# Patient Record
Sex: MALE | Race: WHITE | NOT HISPANIC OR LATINO | Employment: FULL TIME | URBAN - METROPOLITAN AREA
[De-identification: names, ages, dates, MRNs, and addresses within clinical notes are randomized per-mention and may not be internally consistent; named-entity substitution may affect disease eponyms.]

---

## 2023-12-14 ENCOUNTER — OFFICE VISIT (OUTPATIENT)
Dept: URGENT CARE | Facility: CLINIC | Age: 53
End: 2023-12-14
Payer: COMMERCIAL

## 2023-12-14 VITALS
SYSTOLIC BLOOD PRESSURE: 102 MMHG | TEMPERATURE: 98 F | DIASTOLIC BLOOD PRESSURE: 70 MMHG | OXYGEN SATURATION: 97 % | HEIGHT: 76 IN | WEIGHT: 262.88 LBS | HEART RATE: 76 BPM | RESPIRATION RATE: 18 BRPM | BODY MASS INDEX: 32.01 KG/M2

## 2023-12-14 DIAGNOSIS — H10.9 CONJUNCTIVITIS OF LEFT EYE, UNSPECIFIED CONJUNCTIVITIS TYPE: Primary | ICD-10-CM

## 2023-12-14 PROCEDURE — 99203 PR OFFICE/OUTPT VISIT, NEW, LEVL III, 30-44 MIN: ICD-10-PCS | Mod: S$GLB,,, | Performed by: NURSE PRACTITIONER

## 2023-12-14 PROCEDURE — 99203 OFFICE O/P NEW LOW 30 MIN: CPT | Mod: S$GLB,,, | Performed by: NURSE PRACTITIONER

## 2023-12-14 RX ORDER — CITALOPRAM 20 MG/1
1 TABLET, FILM COATED ORAL DAILY
COMMUNITY
Start: 2023-11-24

## 2023-12-14 RX ORDER — ROSUVASTATIN CALCIUM 20 MG/1
20 TABLET, COATED ORAL
COMMUNITY

## 2023-12-14 RX ORDER — PANTOPRAZOLE SODIUM 40 MG/1
40 TABLET, DELAYED RELEASE ORAL EVERY MORNING
COMMUNITY

## 2023-12-14 RX ORDER — ASPIRIN 81 MG/1
1 TABLET ORAL EVERY MORNING
COMMUNITY

## 2023-12-14 RX ORDER — METOPROLOL SUCCINATE 25 MG/1
1 TABLET, EXTENDED RELEASE ORAL DAILY
COMMUNITY
Start: 2023-11-20

## 2023-12-14 RX ORDER — POLYMYXIN B SULFATE AND TRIMETHOPRIM 1; 10000 MG/ML; [USP'U]/ML
1 SOLUTION OPHTHALMIC EVERY 6 HOURS
Qty: 10 ML | Refills: 0 | Status: SHIPPED | OUTPATIENT
Start: 2023-12-14 | End: 2023-12-21

## 2023-12-14 NOTE — PATIENT INSTRUCTIONS
Hand hygiene  Alternate Cool and warm compresses to left eye as needed for comfort  Continue Zyrtec as directed  OTC allergy eye drops as directed  If not improved with allergy drop and symptoms worsen or progress, initiate Polytrim drops as directed   Typical course and duration of illness discussed  Signs and symptoms of worsening discussed  Follow up as needed/with worsening

## 2023-12-14 NOTE — PROGRESS NOTES
"Subjective:      Patient ID: Dc Charles is a 53 y.o. male.    Vitals:  height is 6' 4" (1.93 m) and weight is 119.3 kg (262 lb 14.4 oz). His oral temperature is 98.2 °F (36.8 °C). His blood pressure is 102/70 and his pulse is 76. His respiration is 18 and oxygen saturation is 97%.     Chief Complaint: Stye    53 year old male presents for evaluation of stye to left eye x 3 days. C/O upper eyelid swelling, discomfort and occasional watery discharge. Symptom onset Sunday night/Monday. OTC ointment without relief. History of allergies managed with Zyrtec daily. No contact lens use.     Eye Problem   The left eye is affected. This is a new problem. The current episode started in the past 7 days. The problem occurs constantly. The problem has been unchanged. There was no injury mechanism. The pain is at a severity of 0/10. The patient is experiencing no pain. There is No known exposure to pink eye. He Does not wear contacts. Associated symptoms include an eye discharge (watery), eye redness and itching. Pertinent negatives include no blurred vision, double vision, fever, foreign body sensation, nausea, photophobia, recent URI or vomiting. The treatment provided no relief.       Constitution: Negative. Negative for fever.   HENT: Negative.     Neck: neck negative.   Cardiovascular: Negative.    Eyes:  Positive for eye discharge (watery), eye itching, eye redness and eyelid swelling (upper lid). Negative for eye trauma, foreign body in eye, eye pain, photophobia, vision loss, double vision and blurred vision.   Respiratory: Negative.     Gastrointestinal: Negative.  Negative for nausea and vomiting.   Endocrine: negative.   Genitourinary: Negative.    Musculoskeletal: Negative.    Skin: Negative.    Allergic/Immunologic: Negative.  Positive for seasonal allergies.   Neurological: Negative.    Hematologic/Lymphatic: Negative.    Psychiatric/Behavioral: Negative.        Objective:     Physical Exam   Eyes: Left eye " exhibits discharge (watery). Left eye exhibits no chemosis, no exudate and no hordeolum. No foreign body present in the left eye. Left conjunctiva is not injected. Left conjunctiva has no hemorrhage. Left eye exhibits normal extraocular motion and no nystagmus.      Comments: Left upper lid edema/erythema. Watery discharge. Tarsal conjunctiva erythema/bumpy appearance.       Assessment:     1. Conjunctivitis of left eye, unspecified conjunctivitis type        Plan:       Patient presents with symptoms that are consistent with left eye allergic conjunctivitis. Plan is to manage symptoms and prevent worsening. Decision to prescribe Polytrim course if symptoms worsen/persist. Discussed with patient who verbalizes understanding.     Conjunctivitis of left eye, unspecified conjunctivitis type  -     polymyxin B sulf-trimethoprim (POLYTRIM) 10,000 unit- 1 mg/mL Drop; Place 1 drop into the left eye every 6 (six) hours. for 7 days  Dispense: 10 mL; Refill: 0                Patient Instructions   Hand hygiene  Alternate Cool and warm compresses to left eye as needed for comfort  Continue Zyrtec as directed  OTC allergy eye drops as directed  If not improved with allergy drop and symptoms worsen or progress, initiate Polytrim drops as directed   Typical course and duration of illness discussed  Signs and symptoms of worsening discussed  Follow up as needed/with worsening

## 2023-12-28 ENCOUNTER — OFFICE VISIT (OUTPATIENT)
Dept: URGENT CARE | Facility: CLINIC | Age: 53
End: 2023-12-28
Payer: COMMERCIAL

## 2023-12-28 VITALS
WEIGHT: 262 LBS | SYSTOLIC BLOOD PRESSURE: 100 MMHG | TEMPERATURE: 99 F | BODY MASS INDEX: 31.9 KG/M2 | OXYGEN SATURATION: 95 % | RESPIRATION RATE: 17 BRPM | HEART RATE: 82 BPM | HEIGHT: 76 IN | DIASTOLIC BLOOD PRESSURE: 67 MMHG

## 2023-12-28 DIAGNOSIS — J10.1 INFLUENZA A: Primary | ICD-10-CM

## 2023-12-28 DIAGNOSIS — R06.02 SOB (SHORTNESS OF BREATH): ICD-10-CM

## 2023-12-28 DIAGNOSIS — R05.1 ACUTE COUGH: ICD-10-CM

## 2023-12-28 LAB
CTP QC/QA: YES
CTP QC/QA: YES
POC MOLECULAR INFLUENZA A AGN: POSITIVE
POC MOLECULAR INFLUENZA B AGN: NEGATIVE
SARS-COV-2 AG RESP QL IA.RAPID: NEGATIVE

## 2023-12-28 PROCEDURE — 87811 SARS CORONAVIRUS 2 ANTIGEN POCT, MANUAL READ: ICD-10-PCS | Mod: QW,S$GLB,, | Performed by: PHYSICIAN ASSISTANT

## 2023-12-28 PROCEDURE — 99213 PR OFFICE/OUTPT VISIT, EST, LEVL III, 20-29 MIN: ICD-10-PCS | Mod: S$GLB,,, | Performed by: PHYSICIAN ASSISTANT

## 2023-12-28 PROCEDURE — 87502 POCT INFLUENZA A/B MOLECULAR: ICD-10-PCS | Mod: QW,S$GLB,, | Performed by: PHYSICIAN ASSISTANT

## 2023-12-28 PROCEDURE — 87502 INFLUENZA DNA AMP PROBE: CPT | Mod: QW,S$GLB,, | Performed by: PHYSICIAN ASSISTANT

## 2023-12-28 PROCEDURE — 87811 SARS-COV-2 COVID19 W/OPTIC: CPT | Mod: QW,S$GLB,, | Performed by: PHYSICIAN ASSISTANT

## 2023-12-28 PROCEDURE — 99213 OFFICE O/P EST LOW 20 MIN: CPT | Mod: S$GLB,,, | Performed by: PHYSICIAN ASSISTANT

## 2023-12-28 RX ORDER — ALBUTEROL SULFATE 90 UG/1
2 AEROSOL, METERED RESPIRATORY (INHALATION) EVERY 6 HOURS PRN
Qty: 8 G | Refills: 0 | Status: SHIPPED | OUTPATIENT
Start: 2023-12-28 | End: 2024-01-19

## 2023-12-28 RX ORDER — PROMETHAZINE HYDROCHLORIDE AND DEXTROMETHORPHAN HYDROBROMIDE 6.25; 15 MG/5ML; MG/5ML
5 SYRUP ORAL EVERY 6 HOURS PRN
Qty: 118 ML | Refills: 0 | Status: SHIPPED | OUTPATIENT
Start: 2023-12-28

## 2023-12-28 RX ORDER — OSELTAMIVIR PHOSPHATE 75 MG/1
75 CAPSULE ORAL 2 TIMES DAILY
Qty: 10 CAPSULE | Refills: 0 | Status: SHIPPED | OUTPATIENT
Start: 2023-12-28 | End: 2024-01-02

## 2023-12-28 NOTE — PROGRESS NOTES
"Subjective:      Patient ID: Dc Charles is a 53 y.o. male.    Vitals:  height is 6' 4" (1.93 m) and weight is 118.8 kg (262 lb). His tympanic temperature is 99.1 °F (37.3 °C). His blood pressure is 100/67 and his pulse is 82. His respiration is 17 and oxygen saturation is 95%.     Chief Complaint: Cough    Presents with body aches, cough, postnasal drip, nasal congestion, heart burn, shortness of breath, and headaches. Symptoms present for 3 days. Has taken ibuprofen, tylenol, dayquil, nyquil and sudafed. No relief. Reports sob is intermittent; mostly with coughing or laying down. No hx of asthma or lung issues. No chest pain. Tmax 99.1. Patient states his kids has bronchitis.    Cough  This is a new problem. The current episode started in the past 7 days. The problem has been gradually worsening. The cough is Non-productive. Associated symptoms include headaches, myalgias, nasal congestion, postnasal drip and shortness of breath. Pertinent negatives include no ear pain, sore throat or wheezing. The symptoms are aggravated by lying down. Treatments tried: tylenol, ibuprofen, dayquil, nyquil and sudafed. The treatment provided no relief.       Constitution: Negative.   HENT:  Positive for postnasal drip. Negative for ear pain and sore throat.    Cardiovascular: Negative.    Respiratory:  Positive for cough and shortness of breath. Negative for wheezing and asthma.    Gastrointestinal: Negative.    Musculoskeletal:  Positive for muscle ache.   Skin: Negative.    Allergic/Immunologic: Negative for asthma.   Neurological:  Positive for headaches.      Objective:     Physical Exam   Constitutional: He appears well-developed.  Non-toxic appearance. He does not appear ill. No distress.   HENT:   Head: Normocephalic and atraumatic.   Ears:   Right Ear: External ear normal.   Left Ear: External ear normal.   Nose: Nose normal.   Eyes: Conjunctivae and EOM are normal.   Neck: Neck supple.   Pulmonary/Chest: Effort normal " and breath sounds normal.   Abdominal: Normal appearance.   Musculoskeletal: Normal range of motion.         General: Normal range of motion.   Neurological: no focal deficit. He is alert. He displays no weakness. Gait normal.   Skin: Skin is warm, dry, not diaphoretic, not pale and no rash.   Psychiatric: His behavior is normal.     Results for orders placed or performed in visit on 12/28/23   POCT Influenza A/B MOLECULAR   Result Value Ref Range    POC Molecular Influenza A Ag Positive (A) Negative, Not Reported    POC Molecular Influenza B Ag Negative Negative, Not Reported     Acceptable Yes    SARS Coronavirus 2 Antigen, POCT Manual Read   Result Value Ref Range    SARS Coronavirus 2 Antigen Negative Negative     Acceptable Yes          Assessment:     1. Influenza A    2. Acute cough    3. SOB (shortness of breath)        Plan:     VSS. Positive for Flu A.  Discussed with patient that Tamiflu or Xofluza is optional and also discussed common side effects.  Pt opted for Tamiflu. Fever/pain control with Tylenol or Ibuprofen.  Stay home until fever free for 24 hours without medications.  Promethazine dm prn for cough.  Albuterol prn for sob.  Rest and drink plenty of fluids. Follow-up with PCP if symptoms worsen or fail to improve.     Influenza A  -     oseltamivir (TAMIFLU) 75 MG capsule; Take 1 capsule (75 mg total) by mouth 2 (two) times daily. for 5 days  Dispense: 10 capsule; Refill: 0    Acute cough  -     POCT Influenza A/B MOLECULAR  -     SARS Coronavirus 2 Antigen, POCT Manual Read  -     albuterol (PROVENTIL/VENTOLIN HFA) 90 mcg/actuation inhaler; Inhale 2 puffs into the lungs every 6 (six) hours as needed for Shortness of Breath. Rescue  Dispense: 8 g; Refill: 0  -     promethazine-dextromethorphan (PROMETHAZINE-DM) 6.25-15 mg/5 mL Syrp; Take 5 mLs by mouth every 6 (six) hours as needed (cough). May cause drowsiness.  Dispense: 118 mL; Refill: 0    SOB (shortness of  breath)  -     albuterol (PROVENTIL/VENTOLIN HFA) 90 mcg/actuation inhaler; Inhale 2 puffs into the lungs every 6 (six) hours as needed for Shortness of Breath. Rescue  Dispense: 8 g; Refill: 0

## 2024-01-19 DIAGNOSIS — R06.02 SOB (SHORTNESS OF BREATH): ICD-10-CM

## 2024-01-19 DIAGNOSIS — R05.1 ACUTE COUGH: ICD-10-CM

## 2024-01-19 RX ORDER — ALBUTEROL SULFATE 90 UG/1
2 AEROSOL, METERED RESPIRATORY (INHALATION) EVERY 6 HOURS PRN
Qty: 8 G | Refills: 0 | Status: SHIPPED | OUTPATIENT
Start: 2024-01-19

## 2024-06-12 ENCOUNTER — OFFICE VISIT (OUTPATIENT)
Dept: URGENT CARE | Facility: CLINIC | Age: 54
End: 2024-06-12

## 2024-06-12 VITALS
WEIGHT: 246 LBS | BODY MASS INDEX: 29.96 KG/M2 | OXYGEN SATURATION: 96 % | TEMPERATURE: 97 F | HEIGHT: 76 IN | SYSTOLIC BLOOD PRESSURE: 94 MMHG | HEART RATE: 66 BPM | RESPIRATION RATE: 16 BRPM | DIASTOLIC BLOOD PRESSURE: 66 MMHG

## 2024-06-12 DIAGNOSIS — M54.6 ACUTE BILATERAL THORACIC BACK PAIN: ICD-10-CM

## 2024-06-12 DIAGNOSIS — R82.90 ABNORMAL URINALYSIS: ICD-10-CM

## 2024-06-12 DIAGNOSIS — R10.9 BILATERAL FLANK PAIN: Primary | ICD-10-CM

## 2024-06-12 LAB
BILIRUB UR QL STRIP: POSITIVE
GLUCOSE UR QL STRIP: NEGATIVE
KETONES UR QL STRIP: NEGATIVE
LEUKOCYTE ESTERASE UR QL STRIP: NEGATIVE
PH, POC UA: 6
POC BLOOD, URINE: NEGATIVE
POC NITRATES, URINE: NEGATIVE
PROT UR QL STRIP: POSITIVE
SP GR UR STRIP: 1.03 (ref 1–1.03)
UROBILINOGEN UR STRIP-ACNC: 0.2 (ref 0.3–2.2)

## 2024-06-12 NOTE — PROGRESS NOTES
"Subjective:      Patient ID: Dc Charles is a 54 y.o. male.    Vitals:  height is 6' 4" (1.93 m) and weight is 111.6 kg (246 lb). His tympanic temperature is 97.1 °F (36.2 °C). His blood pressure is 94/66 and his pulse is 66. His respiration is 16 and oxygen saturation is 96%.     Chief Complaint: Flank Pain    Pt presents to the clinic today with bilateral flank pain that radiates to mid back, began 6 days ago.  Denies any injury or change in activity.  Patient describes as feeling achy almost as if he has flu, but he does not have any URI symptoms.  Denies any urinary symptoms or history of kidney stones.  Reports that he has been nauseous and had decreased appetite but denies any vomiting, abdominal pain, fever/chills. Pt eats once nausea subsides but then feels bad afterward. Had 2 loose stools since symptoms started but otherwise normal BM.  Patient states that the area is tender to touch and sore.  Pain seems worse when lying down on his back. Reports some relief when taking Ibuprofen. Denies rash, sob, decreased ROM, focal weakness.  Denies history of liver disease, kidney disease or pancreatitis. No hx of abdominal surgery.      Flank Pain  This is a new problem. The current episode started in the past 7 days. The problem occurs constantly. The problem has been gradually worsening since onset. The quality of the pain is described as aching. The pain does not radiate. The pain is at a severity of 5/10. The pain is mild. The pain is Worse during the night. The symptoms are aggravated by lying down. Pertinent negatives include no abdominal pain, bladder incontinence, bowel incontinence, chest pain, dysuria, fever, headaches, leg pain, numbness, paresis, paresthesias, pelvic pain, perianal numbness, tingling, weakness, weight loss, genital pain, hematuria or urethral discharge. He has tried NSAIDs for the symptoms. The treatment provided mild relief.       Constitution: Negative for chills, sweating and " fever.   HENT:  Negative for ear pain, congestion and sore throat.    Cardiovascular:  Negative for chest pain, leg swelling, palpitations and sob on exertion.   Respiratory: Negative.     Gastrointestinal:  Positive for nausea. Negative for abdominal pain, history of abdominal surgery, vomiting, constipation, diarrhea and bowel incontinence.   Genitourinary:  Positive for flank pain. Negative for dysuria, frequency, urine decreased, bladder incontinence, hematuria, history of kidney stones and pelvic pain.   Musculoskeletal:  Positive for back pain and muscle ache. Negative for trauma.   Skin: Negative.  Negative for erythema.   Neurological:  Negative for dizziness, headaches and numbness.      Objective:     Physical Exam   Constitutional: He appears well-developed.  Non-toxic appearance. He does not appear ill. No distress.   HENT:   Head: Normocephalic and atraumatic.   Ears:   Right Ear: External ear normal.   Left Ear: External ear normal.   Nose: Nose normal.   Eyes: Conjunctivae and EOM are normal.   Neck: Neck supple.   Cardiovascular: Normal rate, regular rhythm and normal heart sounds.   Pulmonary/Chest: Effort normal and breath sounds normal.   Abdominal: Normal appearance and bowel sounds are normal. He exhibits no distension. Soft. There is no abdominal tenderness. There is left CVA tenderness and right CVA tenderness. There is no guarding, no tenderness at McBurney's point and negative Andrade's sign.   Musculoskeletal: Normal range of motion.         General: Normal range of motion.      Thoracic back: He exhibits tenderness (paraspinal musculature bilaterally). He exhibits normal range of motion, no bony tenderness, no swelling and no deformity.        Back:    Neurological: no focal deficit. He is alert. He displays no weakness. Gait normal.   Skin: Skin is warm, dry, not diaphoretic, not pale, no rash and not vesicular. No bruising and No erythema         Comments: No jaundice   Psychiatric: His  behavior is normal. Mood and thought content normal.     Results for orders placed or performed in visit on 06/12/24   POCT Urinalysis, Dipstick, Automated, W/O Scope   Result Value Ref Range    POC Blood, Urine Negative Negative    POC Bilirubin, Urine Positive (A) Negative    POC Urobilinogen, Urine 0.2 (A) 0.3 - 2.2    POC Ketones, Urine Negative Negative    POC Protein, Urine Positive (A) Negative    POC Nitrates, Urine Negative Negative    POC Glucose, Urine Negative Negative    pH, UA 6.0     POC Specific Gravity, Urine 1.030 (A) 1.003 - 1.029    POC Leukocytes, Urine Negative Negative       Assessment:     1. Bilateral flank pain    2. Acute bilateral thoracic back pain    3. Abnormal urinalysis        Plan:       Bilateral flank pain  -     POCT Urinalysis, Dipstick, Automated, W/O Scope    Acute bilateral thoracic back pain  -     POCT Urinalysis, Dipstick, Automated, W/O Scope    Abnormal urinalysis          Medical Decision Making:   Initial Assessment:   NAD. VSS. Bilateral flank/mid-back pain x 6 days with no injury. Denies urinary symptoms, vomiting, diarrhea, rash.   Differential Diagnosis:   Renal stone, pyelonephritis, cholelithiasis, cholecystitis, pancreatitis, liver disease, viral syndrome, shingles, MSK strain  Clinical Tests:   Lab Tests: Ordered and Reviewed  Urgent Care Management:  Less likely kidney stone or pyelonephritis; pt denies urinary symptoms, UA with no hematuria or evidence of infection. UA + bilirubin, +urobiligen, + protein. Offered to do blood work today to check CMP, CBC, Amylase & Lipase but pt declined at this time. He is paying FFS today but has insurance and is established with PCP. States he will f/u with PCP.  No evidence of rash. Discussed possibility of viral syndrome vs MSK pain. Recommend to continue OTC meds prn for pain. Can apply heat or topical OTC pain reliever prn. Close f/u with PCP or ED sooner if any new or worsening symptoms.

## 2024-12-28 ENCOUNTER — OFFICE VISIT (OUTPATIENT)
Dept: URGENT CARE | Facility: CLINIC | Age: 54
End: 2024-12-28
Payer: COMMERCIAL

## 2024-12-28 VITALS
SYSTOLIC BLOOD PRESSURE: 117 MMHG | HEIGHT: 76 IN | OXYGEN SATURATION: 96 % | HEART RATE: 56 BPM | BODY MASS INDEX: 30.27 KG/M2 | DIASTOLIC BLOOD PRESSURE: 69 MMHG | TEMPERATURE: 99 F | RESPIRATION RATE: 18 BRPM | WEIGHT: 248.56 LBS

## 2024-12-28 DIAGNOSIS — B34.9 VIRAL SYNDROME: Primary | ICD-10-CM

## 2024-12-28 PROBLEM — Z95.3: Status: ACTIVE | Noted: 2019-10-15

## 2024-12-28 PROBLEM — R01.1 HEART MURMUR: Status: ACTIVE | Noted: 2017-08-30

## 2024-12-28 PROBLEM — I35.0 NONRHEUMATIC AORTIC VALVE STENOSIS: Status: ACTIVE | Noted: 2017-12-05

## 2024-12-28 PROBLEM — K21.9 GASTROESOPHAGEAL REFLUX DISEASE WITHOUT ESOPHAGITIS: Status: ACTIVE | Noted: 2022-12-06

## 2024-12-28 PROBLEM — I51.7 LVH (LEFT VENTRICULAR HYPERTROPHY): Status: ACTIVE | Noted: 2017-12-05

## 2024-12-28 PROBLEM — E66.812 CLASS 2 OBESITY: Status: ACTIVE | Noted: 2020-11-13

## 2024-12-28 PROBLEM — E78.2 MIXED HYPERLIPIDEMIA: Status: ACTIVE | Noted: 2017-08-30

## 2024-12-28 PROBLEM — I35.1 NONRHEUMATIC AORTIC VALVE INSUFFICIENCY: Status: ACTIVE | Noted: 2017-12-05

## 2024-12-28 PROBLEM — E55.9 VITAMIN D DEFICIENCY: Status: ACTIVE | Noted: 2024-03-05

## 2024-12-28 PROBLEM — R73.03 PREDIABETES: Status: ACTIVE | Noted: 2019-10-11

## 2024-12-28 PROBLEM — Z90.3 H/O GASTRIC SLEEVE: Status: ACTIVE | Noted: 2022-12-06

## 2024-12-28 PROBLEM — F32.5 DEPRESSION, MAJOR, IN REMISSION: Status: ACTIVE | Noted: 2017-08-30

## 2024-12-28 PROCEDURE — 99214 OFFICE O/P EST MOD 30 MIN: CPT | Mod: S$GLB,,, | Performed by: EMERGENCY MEDICINE

## 2024-12-28 RX ORDER — IPRATROPIUM BROMIDE 21 UG/1
2 SPRAY, METERED NASAL 2 TIMES DAILY
Qty: 30 ML | Refills: 0 | Status: SHIPPED | OUTPATIENT
Start: 2024-12-28

## 2024-12-28 RX ORDER — ATORVASTATIN CALCIUM 20 MG/1
20 TABLET, FILM COATED ORAL
COMMUNITY
Start: 2024-10-23

## 2024-12-28 RX ORDER — IPRATROPIUM BROMIDE 21 UG/1
2 SPRAY, METERED NASAL 2 TIMES DAILY
Qty: 30 ML | Refills: 0 | Status: SHIPPED | OUTPATIENT
Start: 2024-12-28 | End: 2024-12-28 | Stop reason: SDUPTHER

## 2024-12-28 RX ORDER — PROMETHAZINE HYDROCHLORIDE AND DEXTROMETHORPHAN HYDROBROMIDE 6.25; 15 MG/5ML; MG/5ML
5 SYRUP ORAL EVERY 4 HOURS PRN
Qty: 180 ML | Refills: 0 | Status: SHIPPED | OUTPATIENT
Start: 2024-12-28 | End: 2024-12-28 | Stop reason: SDUPTHER

## 2024-12-28 RX ORDER — FLUTICASONE PROPIONATE 50 MCG
2 SPRAY, SUSPENSION (ML) NASAL DAILY
Qty: 16 G | Refills: 0 | Status: SHIPPED | OUTPATIENT
Start: 2024-12-28 | End: 2024-12-28 | Stop reason: SDUPTHER

## 2024-12-28 RX ORDER — FLUTICASONE PROPIONATE 50 MCG
2 SPRAY, SUSPENSION (ML) NASAL DAILY
Qty: 16 G | Refills: 0 | Status: SHIPPED | OUTPATIENT
Start: 2024-12-28

## 2024-12-28 RX ORDER — PROMETHAZINE HYDROCHLORIDE AND DEXTROMETHORPHAN HYDROBROMIDE 6.25; 15 MG/5ML; MG/5ML
5 SYRUP ORAL EVERY 4 HOURS PRN
Qty: 180 ML | Refills: 0 | Status: SHIPPED | OUTPATIENT
Start: 2024-12-28 | End: 2025-01-07

## 2024-12-28 NOTE — PATIENT INSTRUCTIONS
Use over the counter(OTC) antihistamine like claritin or zyrtec daily.  Flonase: 2 sprays per nostril 1-2 times a day. For congestion.  Atrovent as prescribed for congestion.  Nasal saline drops: 2-4 drops per nostril 10-15 times a day. Thins mucus    Tylenol or Motrin for fever or pain per label instructions.  Consider use of OTC cough medicine like Robitussin DM for daytime cough. Phenergan DM as prescribed for nighttime cough.  This medication is sedating.  Use with caution.    Warm saltwater gargles to 3 times a day.    Rest and drink plenty of fluids.  Avoid OTC decongestants if you have high blood pressure. This includes multi-cold symptom preparations.    Consider permissive fever to allow your immune system to work.  However, if fever is not tolerable or is disrupting sleep, use Tylenol or Motrin per label instructions.    You are considered contagious until your systemic, or whole body, symptoms have resolved fully for 24 hours.  This includes fever, body aches, fatigue.    Follow up in 2-3 days with PCP if no improvement or any worsening.       Viral Upper Respiratory Infection Discharge Instructions, Adult   About this topic   You have an upper respiratory infection or URI. A URI can affect your nose, throat, ears, and sinuses. A virus is the cause of almost all URIs and antibiotics will not help you feel better more quickly. The common cold is an example of a viral URI.  URIs are easy to spread from person to person, most often through coughing or sneezing. A URI will almost always get better in a week or two without any treatment.         What care is needed at home?   Ask your doctor what you need to do when you go home. Make sure you ask questions if you do not understand what the doctor says.  If you smoke, try to quit. Your doctor or nurse can help.  Drink lots of fluids like water, juice, or broth. This will help replace any fluids lost if you have a runny nose or fever. Warm tea or soup can help  soothe a sore throat.  If the air in your home feels dry, use a cool mist humidifier. This can help a stuffy nose and make it easier to breathe.  You can also use saline nose drops to relieve stuffiness.  If you decide to take over-the-counter cough or cold medicines, follow the directions on the label carefully. Be sure you do not take more than 1 medicine that contains acetaminophen. Also, if you have a heart problem or high blood pressure, check with your doctor before you take any of these medicines.  Wash your hands often. Cough or sneeze into a tissue or your elbow instead of your hands. This will help keep others healthy.  What follow-up care is needed?   Your doctor may ask you to make visits to the office to check on your progress. Be sure to keep these visits.  What drugs may be needed?   The doctor may order drugs to:  Open up the tubes of your lungs  Treat viral infection  Relieve or stop coughing  Help with pain from a sore throat  Relieve runny and stuffy nose  Provide oxygen  Will physical activity be limited?   You need to rest for a few days to let your body recover from the infection.  What changes to diet are needed?   Eat soft foods like soup if swallowing is too painful.  What problems could happen?   Asthma attack  Sinus infections  Lung problems like pneumonia and bronchitis  Severe fluid loss. This is dehydration.  What can be done to prevent this health problem?   Wash your hands often with soap and water for at least 20 seconds, especially after coughing or sneezing. Alcohol-based hand sanitizers also work to kill the virus.  If you are sick, cover your mouth and nose with tissue when you cough or sneeze. You can also cough into your elbow. Throw away tissues in the trash and wash your hands after touching used tissues.  Do not get too close (kissing, hugging) to people who are sick.  Do not share towels or hankies with anyone who is sick. Clean commonly handled things like door handles,  remotes, toys, and phones. Wipe them with a disinfectant.  Stay away from crowded places.  Cover your nose and mouth when you sneeze or cough.  Take vitamin C to help build up your body's ability to fight disease.  Get a flu shot each year.  When do I need to call the doctor?   You have trouble breathing when talking or sitting still.  You have a fever of 100.4°F (38°C) or higher for several days, chills, a very bad sore throat, or ear or sinus pain.  You develop a new fever after several days of feeling the same or improving.  You develop chest pain when you cough.  You have a cough that lasts more than 10 days.  You cough up blood, or the color of the mucus you cough up changes.  Teach Back: Helping You Understand   The Teach Back Method helps you understand the information we are giving you. After you talk with the staff, tell them in your own words what you learned. This helps to make sure the staff has described each thing clearly. It also helps to explain things that may have been confusing. Before going home, make sure you can do these:  I can tell you about my condition.  I can tell you what may help ease my signs.  I can tell you what I will do if I have a fever, chills, breathing very fast, or trouble breathing.  Where can I learn more?   American Lung Association  https://www.lung.org/blog/can-you-exercise-with-a-cold   American Lung Association  https://www.lung.org/lung-health-diseases/lung-disease-lookup/influenza/facts-about-the-common-cold   NHS Choices  https://www.nhs.uk/conditions/respiratory-tract-infection/   UpToDate  https://www.uptodate.com/contents/the-common-cold-in-adults-beyond-the-basics   Last Reviewed Date   2021-06-08  Consumer Information Use and Disclaimer   This information is not specific medical advice and does not replace information you receive from your health care provider. This is only a brief summary of general information. It does NOT include all information about  conditions, illnesses, injuries, tests, procedures, treatments, therapies, discharge instructions or life-style choices that may apply to you. You must talk with your health care provider for complete information about your health and treatment options. This information should not be used to decide whether or not to accept your health care providers advice, instructions or recommendations. Only your health care provider has the knowledge and training to provide advice that is right for you.  Copyright   Copyright © 2021 [employer] and its affiliates and/or licensors. All rights reserved.

## 2024-12-28 NOTE — PROGRESS NOTES
"Subjective:      Patient ID: Dc Charles is a 54 y.o. male.    Vitals:  height is 6' 4" (1.93 m) and weight is 112.8 kg (248 lb 9.1 oz). His tympanic temperature is 98.9 °F (37.2 °C). His blood pressure is 117/69 and his pulse is 56 (abnormal). His respiration is 18 and oxygen saturation is 96%.     Chief Complaint: Sinus Problem    Pt presents to the clinic with cough, sinus congestion/pressure, headache, and sore throat that started about 1 week ago. He has been using Sudafed, Advil, Tylenol with minimal relief.  Overall feeling better with less subjective fever and fatigue    Sinus Problem  This is a new problem. There has been no fever. His pain is at a severity of 4/10. The pain is mild. Associated symptoms include chills, congestion, coughing, headaches, sinus pressure and a sore throat. Pertinent negatives include no diaphoresis, ear pain, hoarse voice, neck pain, shortness of breath, sneezing or swollen glands. Past treatments include acetaminophen (Sudafed, Robitussin,Advil). The treatment provided no relief.       Constitution: Positive for chills. Negative for sweating.   HENT:  Positive for congestion, sinus pressure and sore throat. Negative for ear pain.    Neck: Negative for neck pain.   Respiratory:  Positive for cough. Negative for shortness of breath.    Allergic/Immunologic: Negative for sneezing.   Neurological:  Positive for headaches.      Objective:     Physical Exam   Constitutional: He is oriented to person, place, and time. He appears well-developed. He is cooperative.  Non-toxic appearance. He does not appear ill. No distress.   HENT:   Head: Normocephalic and atraumatic.   Ears:   Right Ear: Hearing and external ear normal.   Left Ear: Hearing and external ear normal.   Nose: Congestion present. No mucosal edema, rhinorrhea or nasal deformity. No epistaxis. Right sinus exhibits no maxillary sinus tenderness and no frontal sinus tenderness. Left sinus exhibits no maxillary sinus " tenderness and no frontal sinus tenderness.      Comments:  No sinus tenderness to percussion  Mouth/Throat: Uvula is midline and mucous membranes are normal. No trismus in the jaw. Normal dentition. No uvula swelling. Posterior oropharyngeal erythema present. No oropharyngeal exudate or posterior oropharyngeal edema.   Eyes: Conjunctivae and lids are normal. No scleral icterus.   Neck: Trachea normal and phonation normal. Neck supple. No edema present. No erythema present. No neck rigidity present.   Cardiovascular: Normal rate, regular rhythm, normal heart sounds and normal pulses.   Pulmonary/Chest: Effort normal and breath sounds normal. No respiratory distress. He has no decreased breath sounds. He has no rhonchi.   Abdominal: Normal appearance.   Musculoskeletal: Normal range of motion.         General: No deformity. Normal range of motion.   Neurological: He is alert and oriented to person, place, and time. He exhibits normal muscle tone. Coordination normal.   Skin: Skin is warm, dry, intact, not diaphoretic and not pale.   Psychiatric: His speech is normal and behavior is normal. Judgment and thought content normal.   Nursing note and vitals reviewed.      Assessment:     1. Viral syndrome        Plan:       Viral syndrome  -     fluticasone propionate (FLONASE) 50 mcg/actuation nasal spray; 2 sprays (100 mcg total) by Each Nostril route once daily.  Dispense: 16 g; Refill: 0  -     ipratropium (ATROVENT) 21 mcg (0.03 %) nasal spray; 2 sprays by Each Nostril route 2 (two) times daily.  Dispense: 30 mL; Refill: 0  -     promethazine-dextromethorphan (PROMETHAZINE-DM) 6.25-15 mg/5 mL Syrp; Take 5 mLs by mouth every 4 (four) hours as needed (cough).  Dispense: 180 mL; Refill: 0          Medical Decision Making:   Initial Assessment:    congestion  Differential Diagnosis:    URI, allergic rhinitis, sinusitis  Urgent Care Management:  Reviewed use of antihistamines, nasal steroid spray, saline drops as well as  rest and hydration to deal with viral symptoms.  Patient verbalizes understanding of and agreement with this plan.